# Patient Record
Sex: MALE | Race: WHITE | URBAN - METROPOLITAN AREA
[De-identification: names, ages, dates, MRNs, and addresses within clinical notes are randomized per-mention and may not be internally consistent; named-entity substitution may affect disease eponyms.]

---

## 2018-01-31 ENCOUNTER — IMPORTED ENCOUNTER (OUTPATIENT)
Dept: URBAN - METROPOLITAN AREA CLINIC 50 | Facility: CLINIC | Age: 63
End: 2018-01-31

## 2018-01-31 NOTE — PATIENT DISCUSSION
"""Not a candidate for Lasik."" ""Informed patient that their cataract is visually significant and meets the criteria for cataract surgery to increase their vision and decrease their glare symptoms.  RBALs of surgery discussed

## 2018-02-19 ENCOUNTER — IMPORTED ENCOUNTER (OUTPATIENT)
Dept: URBAN - METROPOLITAN AREA CLINIC 50 | Facility: CLINIC | Age: 63
End: 2018-02-19

## 2018-07-06 ENCOUNTER — IMPORTED ENCOUNTER (OUTPATIENT)
Dept: URBAN - METROPOLITAN AREA CLINIC 50 | Facility: CLINIC | Age: 63
End: 2018-07-06

## 2018-08-15 ENCOUNTER — IMPORTED ENCOUNTER (OUTPATIENT)
Dept: URBAN - METROPOLITAN AREA CLINIC 50 | Facility: CLINIC | Age: 63
End: 2018-08-15

## 2018-08-23 ENCOUNTER — IMPORTED ENCOUNTER (OUTPATIENT)
Dept: URBAN - METROPOLITAN AREA CLINIC 50 | Facility: CLINIC | Age: 63
End: 2018-08-23

## 2018-08-29 ENCOUNTER — IMPORTED ENCOUNTER (OUTPATIENT)
Dept: URBAN - METROPOLITAN AREA CLINIC 50 | Facility: CLINIC | Age: 63
End: 2018-08-29

## 2018-09-04 ENCOUNTER — IMPORTED ENCOUNTER (OUTPATIENT)
Dept: URBAN - METROPOLITAN AREA CLINIC 50 | Facility: CLINIC | Age: 63
End: 2018-09-04

## 2018-09-04 NOTE — PATIENT DISCUSSION
"""S/P IOL OS: Symfony ZXR00 20.00 +ORA/Femto/Arcs +TM. Continue post operative instructions and drops per schedule.  """

## 2018-09-14 ENCOUNTER — IMPORTED ENCOUNTER (OUTPATIENT)
Dept: URBAN - METROPOLITAN AREA CLINIC 50 | Facility: CLINIC | Age: 63
End: 2018-09-14

## 2018-09-18 ENCOUNTER — IMPORTED ENCOUNTER (OUTPATIENT)
Dept: URBAN - METROPOLITAN AREA CLINIC 50 | Facility: CLINIC | Age: 63
End: 2018-09-18

## 2018-09-18 NOTE — PATIENT DISCUSSION
"""S/P IOL OD: Symfony ZXR00 +19.0 +ORA/Femto/Arcs +TM. Continue post operative instructions and drops per schedule.  """

## 2018-10-10 ENCOUNTER — IMPORTED ENCOUNTER (OUTPATIENT)
Dept: URBAN - METROPOLITAN AREA CLINIC 50 | Facility: CLINIC | Age: 63
End: 2018-10-10

## 2019-02-27 ENCOUNTER — PREPPED CHART (OUTPATIENT)
Dept: URBAN - METROPOLITAN AREA CLINIC 50 | Facility: CLINIC | Age: 64
End: 2019-02-27

## 2019-02-27 ENCOUNTER — IMPORTED ENCOUNTER (OUTPATIENT)
Dept: URBAN - METROPOLITAN AREA CLINIC 50 | Facility: CLINIC | Age: 64
End: 2019-02-27

## 2019-02-27 DIAGNOSIS — E11.9: ICD-10-CM

## 2019-02-27 DIAGNOSIS — H26.492: ICD-10-CM

## 2019-02-27 DIAGNOSIS — H16.223: ICD-10-CM

## 2019-02-27 DIAGNOSIS — H43.813: ICD-10-CM

## 2019-02-27 PROCEDURE — 66821 AFTER CATARACT LASER SURGERY: CPT

## 2019-02-27 PROCEDURE — 92014 COMPRE OPH EXAM EST PT 1/>: CPT

## 2019-02-27 PROCEDURE — 92012 INTRM OPH EXAM EST PATIENT: CPT

## 2019-02-27 NOTE — PATIENT DISCUSSION
DM NORMAL: No signs of diabetic retinopathy or diabetic macular edema on exam. Continue blood sugar and blood pressure control; consider exercise program as directed by PCP. We will re-evaluate in one year or sooner as needed. Letter to PCP.

## 2019-02-27 NOTE — PATIENT DISCUSSION
PCO ([id number]): Visually significant PCO present on exam today. Recommend YAG laser capsulotomy to improve vision and decrease glare symptoms. RBAs of procedure discussed. Patient agrees and wishes to proceed.

## 2019-02-27 NOTE — PROCEDURE NOTE: CLINICAL
PROCEDURE NOTE: YAG Capsulotomy #1 OS. Diagnosis: Posterior Capsular Opacification (PCO). Prior to treatment, the risks/benefits/alternatives were discussed. The patient wished to proceed with procedure. Consent was signed. Proparacaine and brominidine were placed into the operative eye after the eye was dilated. Power = 2.8mJ. Number of pulses = 14. Patient tolerated procedure well and there were no complications. Post Laser instructions given. Kassandra Tracy

## 2019-02-27 NOTE — PATIENT DISCUSSION
"""Call if vision decreases or RD warning signs increases/RD warnings given. No signs of retinal tear. Retinal detachment precautions discussed.  ""."

## 2021-04-17 ASSESSMENT — VISUAL ACUITY
OS_CC: 20/80
OS_BAT: 20/400
OS_CC: 20/40
OS_SC: 20/40
OD_SC: 20/25+2
OD_OTHER: 20/200. 20/400.
OS_OTHER: >20/400.
OD_OTHER: 20/200. 20/400.
OS_CC: 20/60-
OD_BAT: 20/200
OS_OTHER: 20/80.
OD_BAT: 20/200
OD_OTHER: 20/50. 20/70.
OD_CC: J1+/-
OD_BAT: 20/200
OS_OTHER: >20/400. >20/400.
OS_PH: @ 15 IN
OS_OTHER: >20/400.
OD_BAT: 20/200
OD_GLARE: 20/70
OS_CC: J2
OD_BAT: 20/50
OS_CC: 20/40
OS_CC: J1+/-
OS_BAT: >20/400
OS_GLARE: 20/80
OS_SC: 20/20
OS_BAT: >20/400
OD_CC: 20/30+2
OD_SC: 20/30-
OD_OTHER: 20/200. 20/400.
OS_BAT: >20/400
OD_CC: 20/20
OD_BAT: >20/400
OD_OTHER: >20/400.
OS_CC: J1+
OS_CC: 20/40
OS_BAT: 20/80
OD_CC: 20/40
OD_CC: J1+
OD_CC: 20/50-
OS_CC: J1+
OD_SC: 20/20
OS_SC: 20/20
OD_OTHER: 20/200. 20/400.
OD_GLARE: 20/50
OD_CC: J1+
OS_OTHER: 20/400.

## 2021-04-17 ASSESSMENT — TONOMETRY
OS_IOP_MMHG: 18
OS_IOP_MMHG: 16
OD_IOP_MMHG: 16
OS_IOP_MMHG: 13
OD_IOP_MMHG: 17
OD_IOP_MMHG: 13
OD_IOP_MMHG: 14
OD_IOP_MMHG: 16
OS_IOP_MMHG: 13
OD_IOP_MMHG: 13
OS_IOP_MMHG: 06
OS_IOP_MMHG: 16
OD_IOP_MMHG: 16
OS_IOP_MMHG: 18
OS_IOP_MMHG: 13

## 2021-04-21 ENCOUNTER — COMPREHENSIVE EXAM (OUTPATIENT)
Dept: URBAN - METROPOLITAN AREA CLINIC 50 | Facility: CLINIC | Age: 66
End: 2021-04-21

## 2021-04-21 DIAGNOSIS — E11.9: ICD-10-CM

## 2021-04-21 DIAGNOSIS — H25.11: ICD-10-CM

## 2021-04-21 DIAGNOSIS — H16.223: ICD-10-CM

## 2021-04-21 DIAGNOSIS — H52.4: ICD-10-CM

## 2021-04-21 DIAGNOSIS — H35.373: ICD-10-CM

## 2021-04-21 PROCEDURE — 92015 DETERMINE REFRACTIVE STATE: CPT

## 2021-04-21 PROCEDURE — 92014 COMPRE OPH EXAM EST PT 1/>: CPT

## 2021-04-21 ASSESSMENT — VISUAL ACUITY
OD_GLARE: 20/40
OD_PH: 20/25-2
OU_SC: J2
OD_SC: 20/30
OS_SC: 20/40
OD_GLARE: 20/400

## 2021-04-21 ASSESSMENT — TONOMETRY
OD_IOP_MMHG: 17
OS_IOP_MMHG: 16

## 2021-05-06 ENCOUNTER — BIOMETRY (OUTPATIENT)
Dept: URBAN - METROPOLITAN AREA CLINIC 50 | Facility: CLINIC | Age: 66
End: 2021-05-06

## 2021-05-06 DIAGNOSIS — H25.11: ICD-10-CM

## 2021-05-06 PROCEDURE — 92136 OPHTHALMIC BIOMETRY: CPT

## 2021-05-06 PROCEDURE — TOPOIOL CORNEAL TOPOGRAPHY-PREMIUM IOL

## 2021-05-06 ASSESSMENT — KERATOMETRY
OD_K2POWER_DIOPTERS: 42.12
OD_AXISANGLE_DEGREES: 45
OD_K1POWER_DIOPTERS: 42.37
OD_AXISANGLE2_DEGREES: 135

## 2021-05-06 NOTE — PATIENT DISCUSSION
OCT @ preop/ OD only/pt wanting near [address] OD/Recommended against NVO OD/discussed MTF/pt receptive/.

## 2021-05-12 ENCOUNTER — CATARACT PRE-OP (OUTPATIENT)
Dept: URBAN - METROPOLITAN AREA CLINIC 50 | Facility: CLINIC | Age: 66
End: 2021-05-12

## 2021-05-12 VITALS — HEART RATE: 74 BPM | SYSTOLIC BLOOD PRESSURE: 166 MMHG | HEIGHT: 60 IN | DIASTOLIC BLOOD PRESSURE: 99 MMHG

## 2021-05-12 DIAGNOSIS — H35.373: ICD-10-CM

## 2021-05-12 DIAGNOSIS — H25.11: ICD-10-CM

## 2021-05-12 PROCEDURE — 92134 CPTRZ OPH DX IMG PST SGM RTA: CPT

## 2021-05-12 PROCEDURE — PREOP PRE OP VISIT

## 2021-05-12 ASSESSMENT — KERATOMETRY
OD_AXISANGLE2_DEGREES: 135
OD_K2POWER_DIOPTERS: 42.12
OD_K1POWER_DIOPTERS: 42.37
OD_AXISANGLE_DEGREES: 45

## 2021-05-12 ASSESSMENT — VISUAL ACUITY
OD_SC: 20/25-1
OS_SC: 20/25+2
OU_SC: 20/20-2

## 2021-05-12 ASSESSMENT — TONOMETRY
OD_IOP_MMHG: 16
OS_IOP_MMHG: 14

## 2021-05-18 ENCOUNTER — SAME DAY PO (OUTPATIENT)
Dept: URBAN - METROPOLITAN AREA CLINIC 49 | Facility: CLINIC | Age: 66
End: 2021-05-18

## 2021-05-18 ENCOUNTER — SURGERY/PROCEDURE (OUTPATIENT)
Dept: URBAN - METROPOLITAN AREA SURGERY 16 | Facility: SURGERY | Age: 66
End: 2021-05-18

## 2021-05-18 DIAGNOSIS — Z98.41: ICD-10-CM

## 2021-05-18 DIAGNOSIS — H25.11: ICD-10-CM

## 2021-05-18 DIAGNOSIS — Z96.1: ICD-10-CM

## 2021-05-18 PROCEDURE — 99024 POSTOP FOLLOW-UP VISIT: CPT

## 2021-05-18 PROCEDURE — ZXR00FEMTO

## 2021-05-18 PROCEDURE — 66984 XCAPSL CTRC RMVL W/O ECP: CPT

## 2021-05-18 ASSESSMENT — KERATOMETRY
OD_K2POWER_DIOPTERS: 42.12
OD_AXISANGLE2_DEGREES: 135
OD_AXISANGLE_DEGREES: 45
OD_K1POWER_DIOPTERS: 42.37

## 2021-05-18 ASSESSMENT — TONOMETRY: OD_IOP_MMHG: 18

## 2021-05-18 ASSESSMENT — VISUAL ACUITY: OD_CC: 20/40-2

## 2021-05-26 ENCOUNTER — 1 WEEK POST-OP (OUTPATIENT)
Dept: URBAN - METROPOLITAN AREA CLINIC 50 | Facility: CLINIC | Age: 66
End: 2021-05-26

## 2021-05-26 DIAGNOSIS — H25.11: ICD-10-CM

## 2021-05-26 DIAGNOSIS — Z98.41: ICD-10-CM

## 2021-05-26 PROCEDURE — 99024 POSTOP FOLLOW-UP VISIT: CPT

## 2021-05-26 ASSESSMENT — VISUAL ACUITY
OS_SC: 20/25-2
OU_SC: J2@22IN
OD_SC: J2@22IN
OU_SC: J4@14IN
OS_SC: J2@22IN
OD_SC: 20/25+2
OS_SC: J5@14IN
OU_SC: 20/20-1
OD_SC: J4@14IN

## 2021-05-26 ASSESSMENT — TONOMETRY
OD_IOP_MMHG: 15
OS_IOP_MMHG: 13

## 2021-05-26 NOTE — PATIENT DISCUSSION
Informed patient that he is cleared to resume all normal activities but to take caution with his eyes with activities such as basketball/tennis.

## 2021-06-23 ENCOUNTER — 4 WEEK POST-OP (OUTPATIENT)
Dept: URBAN - METROPOLITAN AREA CLINIC 50 | Facility: CLINIC | Age: 66
End: 2021-06-23

## 2021-06-23 DIAGNOSIS — Z96.1: ICD-10-CM

## 2021-06-23 PROCEDURE — 99024 POSTOP FOLLOW-UP VISIT: CPT

## 2021-06-23 PROCEDURE — 92015 DETERMINE REFRACTIVE STATE: CPT

## 2021-06-23 RX ORDER — LIFITEGRAST 50 MG/ML
1 SOLUTION/ DROPS OPHTHALMIC TWICE A DAY
Start: 2021-06-23

## 2021-06-23 ASSESSMENT — VISUAL ACUITY
OD_SC: 20/50
OD_SC: 20/20
OS_SC: 20/30
OD_SC: J5

## 2021-06-23 ASSESSMENT — TONOMETRY
OD_IOP_MMHG: 15
OS_IOP_MMHG: 15

## 2021-06-23 NOTE — PATIENT DISCUSSION
discusse with patient that his eyes are dry,  it is recommended patient start Derl Slay twice a day in both eyes,  increase artificial tears to 3 times a day in both eyes.  will recheck patient in 4-6 weeks.

## 2021-06-23 NOTE — PATIENT DISCUSSION
Healed well,  discussed that his lens takes a couple of months to get the near vision.  Discussed with patient that his eyes are also dry.  recommend patient start xiidra twice a day in both eyes and increase artificial tears to 3 times a day and will recheck patient in 4-6 weeks.